# Patient Record
Sex: MALE | Race: WHITE | NOT HISPANIC OR LATINO | Employment: OTHER | URBAN - METROPOLITAN AREA
[De-identification: names, ages, dates, MRNs, and addresses within clinical notes are randomized per-mention and may not be internally consistent; named-entity substitution may affect disease eponyms.]

---

## 2017-08-17 ENCOUNTER — ALLSCRIPTS OFFICE VISIT (OUTPATIENT)
Dept: OTHER | Facility: OTHER | Age: 77
End: 2017-08-17

## 2017-08-17 LAB
BILIRUB UR QL STRIP: NORMAL
CLARITY UR: NORMAL
COLOR UR: YELLOW
GLUCOSE (HISTORICAL): NORMAL
HGB UR QL STRIP.AUTO: NORMAL
KETONES UR STRIP-MCNC: NORMAL MG/DL
LEUKOCYTE ESTERASE UR QL STRIP: NORMAL
NITRITE UR QL STRIP: NORMAL
PH UR STRIP.AUTO: 5.5 [PH]
PROT UR STRIP-MCNC: NORMAL MG/DL
SP GR UR STRIP.AUTO: 1.02
UROBILINOGEN UR QL STRIP.AUTO: 0.2

## 2017-08-31 DIAGNOSIS — R97.20 ELEVATED PROSTATE SPECIFIC ANTIGEN (PSA): ICD-10-CM

## 2017-09-19 ENCOUNTER — ALLSCRIPTS OFFICE VISIT (OUTPATIENT)
Dept: OTHER | Facility: OTHER | Age: 77
End: 2017-09-19

## 2017-09-19 ENCOUNTER — LAB REQUISITION (OUTPATIENT)
Dept: LAB | Facility: HOSPITAL | Age: 77
End: 2017-09-19
Payer: MEDICARE

## 2017-09-19 DIAGNOSIS — N39.0 URINARY TRACT INFECTION: ICD-10-CM

## 2017-09-19 LAB
BILIRUB UR QL STRIP: NEGATIVE
CLARITY UR: NORMAL
COLOR UR: YELLOW
GLUCOSE (HISTORICAL): NEGATIVE
HGB UR QL STRIP.AUTO: NORMAL
KETONES UR STRIP-MCNC: NORMAL MG/DL
LEUKOCYTE ESTERASE UR QL STRIP: NORMAL
NITRITE UR QL STRIP: POSITIVE
PH UR STRIP.AUTO: 5.5 [PH]
PROT UR STRIP-MCNC: NORMAL MG/DL
SP GR UR STRIP.AUTO: 1.03
UROBILINOGEN UR QL STRIP.AUTO: 0.2

## 2017-09-19 PROCEDURE — 87186 SC STD MICRODIL/AGAR DIL: CPT | Performed by: UROLOGY

## 2017-09-19 PROCEDURE — 87077 CULTURE AEROBIC IDENTIFY: CPT | Performed by: UROLOGY

## 2017-09-19 PROCEDURE — 87086 URINE CULTURE/COLONY COUNT: CPT | Performed by: UROLOGY

## 2017-09-21 ENCOUNTER — GENERIC CONVERSION - ENCOUNTER (OUTPATIENT)
Dept: OTHER | Facility: OTHER | Age: 77
End: 2017-09-21

## 2017-09-21 LAB — BACTERIA UR CULT: NORMAL

## 2017-10-19 ENCOUNTER — GENERIC CONVERSION - ENCOUNTER (OUTPATIENT)
Dept: OTHER | Facility: OTHER | Age: 77
End: 2017-10-19

## 2018-01-14 VITALS — WEIGHT: 165 LBS | BODY MASS INDEX: 25.01 KG/M2 | HEIGHT: 68 IN

## 2018-01-16 NOTE — MISCELLANEOUS
Message   Recorded as Task   Date: 09/21/2017 09:25 AM, Created By: Jasen Correa   Task Name: Call Back   Assigned To: Evan Diehl,TEAM   Regarding Patient: Sandra Hernandez, Status: In Progress   CommentValdene Min - 21 Sep 2017 9:25 AM     TASK CREATED  Caller: Qing, Adult Child; [phone] (Day)  Daughter in law Qing calling  Patient started Flomax last evening  Woke with back pain and muscle pain along with leg pain  Qnig said she read the side effeects of Flomax and they said if patient has back or leg pain to call the doctor    Please call Qing to advise   Nayana Turcios - 21 Sep 2017 2:04 PM     TASK EDITED   Rajesh Olvera - 21 Sep 2017 2:16 PM     TASK IN [address] [place] [address] - 21 Sep 2017 2:16 PM     TASK EDITED  WILL FORWARD TO Patricia Clark - 21 Sep 2017 2:20 PM     TASK EDITED  CALLED PT  HE WILL STOP FLOMAX PER DR Luis Hernandez  Active Problems    1  Acute UTI (599 0) (N39 0)   2  Benign prostatic hyperplasia with lower urinary tract symptoms (600 01) (N40 1)   3  Bladder cancer (188 9) (C67 9)   4  Elevated prostate specific antigen (PSA) (790 93) (R97 20)   5  Encounter for prostate cancer screening (V76 44) (Z12 5)   6  Primary malignant neoplasm of dome of bladder (188 1) (C67 1)   7  Weak urinary stream (788 62) (R39 12)    Current Meds   1  Aspirin 81 MG TABS; TAKE 1 TABLET DAILY; Therapy: (Recorded:17Aug2017) to Recorded   2  Carvedilol 6 25 MG Oral Tablet; take 2 tablet twice daily; Therapy: (Recorded:17Aug2017) to Recorded   3  Coumadin 2 5 MG Oral Tablet (Warfarin Sodium); TAKE AS DIRECTED; Therapy: (Recorded:17Aug2017) to Recorded   4  Crestor 5 MG Oral Tablet (Rosuvastatin Calcium); TAKE 1 TABLET DAILY; Therapy: (Recorded:17Aug2017) to Recorded   5  Daily Multivitamin TABS; TAKE 1 TABLET DAILY; Therapy: (Recorded:17Aug2017) to Recorded   6  Multaq 400 MG Oral Tablet; take 2 tablet daily;    Therapy: (Recorded:12Uzc2918) to Recorded   7  Sulfamethoxazole-Trimethoprim 400-80 MG Oral Tablet (Bactrim); Take 1 tablet twice   daily; Therapy: 45Ell1830 to (Evaluate:03Ddr8958)  Requested for: 31Eau9119; Last   Rx:94Mgd8426 Ordered   8  Tamsulosin HCl - 0 4 MG Oral Capsule (Flomax); TAKE 1 CAPSULE Bedtime; Therapy: 48Mhm7721 to (Evaluate:09Xnf5512)  Requested for: 36Nmv9086; Last   Rx:66Glp0367 Ordered    Allergies    1   No Known Drug Allergies    Signatures   Electronically signed by : Yao Tadeo, ; Sep 21 2017  2:20PM EST                       (Author)

## 2018-01-22 VITALS
SYSTOLIC BLOOD PRESSURE: 100 MMHG | HEIGHT: 68 IN | WEIGHT: 155 LBS | DIASTOLIC BLOOD PRESSURE: 60 MMHG | BODY MASS INDEX: 23.49 KG/M2

## 2018-01-22 VITALS
HEIGHT: 68 IN | WEIGHT: 157 LBS | DIASTOLIC BLOOD PRESSURE: 50 MMHG | BODY MASS INDEX: 23.79 KG/M2 | SYSTOLIC BLOOD PRESSURE: 98 MMHG

## 2018-02-16 RX ORDER — ROSUVASTATIN CALCIUM 5 MG/1
1 TABLET, COATED ORAL DAILY
COMMUNITY

## 2018-02-16 RX ORDER — WARFARIN SODIUM 2.5 MG/1
TABLET ORAL
COMMUNITY

## 2018-02-16 RX ORDER — CARVEDILOL 6.25 MG/1
2 TABLET ORAL 2 TIMES DAILY
COMMUNITY

## 2018-02-16 RX ORDER — ERGOCALCIFEROL (VITAMIN D2) 10 MCG
1 TABLET ORAL DAILY
COMMUNITY

## 2018-02-17 DIAGNOSIS — Z12.5 ENCOUNTER FOR SCREENING FOR MALIGNANT NEOPLASM OF PROSTATE: ICD-10-CM

## 2018-02-19 ENCOUNTER — PROCEDURE VISIT (OUTPATIENT)
Dept: UROLOGY | Facility: MEDICAL CENTER | Age: 78
End: 2018-02-19
Payer: MEDICARE

## 2018-02-19 VITALS
SYSTOLIC BLOOD PRESSURE: 126 MMHG | WEIGHT: 159 LBS | HEIGHT: 68 IN | BODY MASS INDEX: 24.1 KG/M2 | DIASTOLIC BLOOD PRESSURE: 60 MMHG

## 2018-02-19 DIAGNOSIS — C67.8 MALIGNANT NEOPLASM OF OVERLAPPING SITES OF BLADDER (HCC): Primary | ICD-10-CM

## 2018-02-19 LAB
SL AMB  POCT GLUCOSE, UA: NEGATIVE
SL AMB LEUKOCYTE ESTERASE,UA: NEGATIVE
SL AMB POCT BILIRUBIN,UA: NEGATIVE
SL AMB POCT BLOOD,UA: NEGATIVE
SL AMB POCT CLARITY,UA: CLEAR
SL AMB POCT COLOR,UA: YELLOW
SL AMB POCT KETONES,UA: NEGATIVE
SL AMB POCT NITRITE,UA: NEGATIVE
SL AMB POCT PH,UA: 5.5
SL AMB POCT SPECIFIC GRAVITY,UA: 1.01
SL AMB POCT URINE PROTEIN: NEGATIVE
SL AMB POCT UROBILINOGEN: 0.2

## 2018-02-19 PROCEDURE — 81003 URINALYSIS AUTO W/O SCOPE: CPT | Performed by: UROLOGY

## 2018-02-19 PROCEDURE — 52000 CYSTOURETHROSCOPY: CPT | Performed by: UROLOGY

## 2018-02-19 PROCEDURE — 99212 OFFICE O/P EST SF 10 MIN: CPT | Performed by: UROLOGY

## 2018-02-19 NOTE — LETTER
2018     [address] [id number]    Patient: Renuka Fu  YOB: 1940   Date of Visit: 2018       Dear Dr William Kim: Thank you for referring Kelley Cook to me for evaluation  Below are my notes for this consultation  If you have questions, please do not hesitate to call me  I look forward to following your patient along with you  Sincerely,        Akua Kaplan MD        CC: No Recipients  Akua Kaplan MD  2018 10:46 AM  Signed  [address] [facility]  [address]  [phone]  www  [facility]  org      NAME: Sara Le Sr   AGE: 76 y o  SEX: male  : 1940   MRN: [id number]    DATE: 2018  TIME: 10:27 AM    Assessment and Plan:  Bladder cancer:  No evidence of disease 8 years status post trans urethral resection of bladder tumor  The lesion I saw year ago appears to resolve so obviously was not cancer  No need for further scopes  BPH with obstruction:  He is doing well on no medication  Given how he is doing, and his medical issues, I will see him p r n  only  He has made great improvement  Chief Complaint   No chief complaint on file  History of Present Illness   Bladder cancer_ status post trans urethral resection bladder tumor , had this small tumor which was 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 on cystoscopy last year  We are observing this, but he also was having severe BPH with obstruction and increasing lower urinary tract symptoms, and he has a an enlarged median lobe and obstructive transitional zone tissue  He is here for cystoscopy today to re-evaluate the situation  He is on no urologic medications, and he says his voiding symptoms are tolerable  PSA is very low at 0 43      Cystoscopy Procedure Note        Pre-operative Diagnosis:  History of bladder cancer    Post-operative Diagnosis:  No evidence recurrence Same,    Procedure: Flexible cystoscopy    Surgeon: Helen Reid MD    Anesthesia: 1% Xylocaine per urethra    EBL: Minimal    Complications: none    Procedure Details   The risks, benefits, complications, treatment options, and expected outcomes were discussed with the patient  The patient concurred with the proposed plan, giving informed consent  Cystoscopy was performed today under local anesthesia, using sterile technique  The patient was placed in the supine position, prepped with Betadine, and draped in the usual sterile fashion  The flexible cystocope was used to inspect both the urethra and bladder    Findings:  Urethra:  Normal without stricture  The prostate this time is moderately obstructing, even looks decreased from the last cystoscopy in the median lobe is not as large  There is a benign cyst at the 8 o'clock position of the prostatic urethra near the bladder neck  Bladder:  3+ trabeculated and there were no stones tumors or other lesions  The orifices were orthotopic and intact  Specimens:  None                 Complications:  None           Disposition: To home            Condition:  Stable          The following portions of the patient's history were reviewed and updated as appropriate: allergies, current medications, past family history, past medical history, past social history, past surgical history and problem list     Review of Systems   Review of Systems    Active Problem List   There is no problem list on file for this patient        Objective   /60 (BP Location: Right arm, Patient Position: Sitting)   Ht 5' 8" (1 727 m)   Wt 72 1 kg (159 lb)   BMI 24 18 kg/m²      Physical Exam    Pertinent Laboratory/Diagnostic Studies:  CBC: No results found for: WBC, RBC, HGB, HCT, MCV, MCH, MCHC, RDW, MPV, PLT, NRBC, NEUTOPHILPCT, LYMPHOPCT, MONOPCT, EOSPCT, BASOPCT, NEUTROABS, LYMPHSABS, MONOSABS, EOSABS, MONOSABS  Chemistry Profile:   Lab Results   Component Value Date/Time    SLAMBGLUCOSE negative 02/19/2018 10:31 AM       Current Medications     Current Outpatient Prescriptions:     aspirin 81 MG tablet, Take 1 tablet by mouth daily, Disp: , Rfl:     carvedilol (COREG) 6 25 mg tablet, Take 2 tablets by mouth 2 (two) times a day, Disp: , Rfl:     dronedarone (MULTAQ) 400 mg tablet, Take 2 tablets by mouth daily, Disp: , Rfl:     Multiple Vitamin (DAILY VALUE MULTIVITAMIN) TABS, Take 1 tablet by mouth daily, Disp: , Rfl:     rosuvastatin (CRESTOR) 5 mg tablet, Take 1 tablet by mouth daily, Disp: , Rfl:     warfarin (COUMADIN) 2 5 mg tablet, Take by mouth, Disp: , Rfl:         Severa Chol, MD

## 2018-02-19 NOTE — PROGRESS NOTES
[address] [facility]  [facility]  [address] [facility]  [place], [address] [place] [address]  [phone]  www  [facility]  org      NAME: Candelaria Le Sr   AGE: 76 y o  SEX: male  : 1940   MRN: [id number]    DATE: 2018  TIME: 10:27 AM    Assessment and Plan:  Bladder cancer:  No evidence of disease 8 years status post trans urethral resection of bladder tumor  The lesion I saw year ago appears to resolve so obviously was not cancer  No need for further scopes  BPH with obstruction:  He is doing well on no medication  Given how he is doing, and his medical issues, I will see him p r n  only  He has made great improvement  Chief Complaint   No chief complaint on file  History of Present Illness   Bladder cancer_ status post trans urethral resection bladder tumor , had this small tumor which was 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 on cystoscopy last year  We are observing this, but he also was having severe BPH with obstruction and increasing lower urinary tract symptoms, and he has a an enlarged median lobe and obstructive transitional zone tissue  He is here for cystoscopy today to re-evaluate the situation  He is on no urologic medications, and he says his voiding symptoms are tolerable  PSA is very low at 0 43  Cystoscopy Procedure Note        Pre-operative Diagnosis:  History of bladder cancer    Post-operative Diagnosis:  No evidence recurrence Same,    Procedure: Flexible cystoscopy    Surgeon: Brett Waddell MD    Anesthesia: 1% Xylocaine per urethra    EBL: Minimal    Complications: none    Procedure Details   The risks, benefits, complications, treatment options, and expected outcomes were discussed with the patient  The patient concurred with the proposed plan, giving informed consent  Cystoscopy was performed today under local anesthesia, using sterile technique   The patient was placed in the supine position, prepped with Betadine, and draped in the usual sterile fashion  The flexible cystocope was used to inspect both the urethra and bladder    Findings:  Urethra:  Normal without stricture  The prostate this time is moderately obstructing, even looks decreased from the last cystoscopy in the median lobe is not as large  There is a benign cyst at the 8 o'clock position of the prostatic urethra near the bladder neck  Bladder:  3+ trabeculated and there were no stones tumors or other lesions  The orifices were orthotopic and intact  Specimens:  None                 Complications:  None           Disposition: To home            Condition:  Stable          The following portions of the patient's history were reviewed and updated as appropriate: allergies, current medications, past family history, past medical history, past social history, past surgical history and problem list     Review of Systems   Review of Systems    Active Problem List   There is no problem list on file for this patient        Objective   /60 (BP Location: Right arm, Patient Position: Sitting)   Ht 5' 8" (1 727 m)   Wt 72 1 kg (159 lb)   BMI 24 18 kg/m²     Physical Exam    Pertinent Laboratory/Diagnostic Studies:  CBC: No results found for: WBC, RBC, HGB, HCT, MCV, MCH, MCHC, RDW, MPV, PLT, NRBC, NEUTOPHILPCT, LYMPHOPCT, MONOPCT, EOSPCT, BASOPCT, NEUTROABS, LYMPHSABS, MONOSABS, EOSABS, MONOSABS  Chemistry Profile:   Lab Results   Component Value Date/Time    SLAMBGLUCOSE negative 02/19/2018 10:31 AM       Current Medications     Current Outpatient Prescriptions:     aspirin 81 MG tablet, Take 1 tablet by mouth daily, Disp: , Rfl:     carvedilol (COREG) 6 25 mg tablet, Take 2 tablets by mouth 2 (two) times a day, Disp: , Rfl:     dronedarone (MULTAQ) 400 mg tablet, Take 2 tablets by mouth daily, Disp: , Rfl:     Multiple Vitamin (DAILY VALUE MULTIVITAMIN) TABS, Take 1 tablet by mouth daily, Disp: , Rfl:    rosuvastatin (CRESTOR) 5 mg tablet, Take 1 tablet by mouth daily, Disp: , Rfl:     warfarin (COUMADIN) 2 5 mg tablet, Take by mouth, Disp: , Rfl:         Bebeto Bray MD